# Patient Record
(demographics unavailable — no encounter records)

---

## 2025-01-22 NOTE — HISTORY OF PRESENT ILLNESS
[FreeTextEntry1] : 29-year-old female presents for well woman exam.  She has no complaints today.  Menarche was at the age of 16.  Menses are every 28 days, lasting 7 days.  She does get heavy bleeding for the first day but does not soak through more than 1 pad per hour.  She does get cramping that is relieved with ibuprofen.  She has no significant GYN history.  She is sexually active and uses condoms for contraception.  Past medical history is significant for seasonal allergies.  She has never had surgery.  Family history is significant for a maternal aunt with breast cancer.  She is unsure of the exact age of her aunts diagnosis but thinks she was between the age of 40 and 50.  She is unsure if she had genetic testing.  She socially drinks alcohol.  Denies tobacco or illicit drugs.  GYN history as above.  Nulligravida.  No known drug allergies.  She takes Claritin and multivitamins.

## 2025-01-22 NOTE — PLAN
[FreeTextEntry1] : 29-year-old female for well woman exam  1.  Pap done 2.  Self breast exam reviewed 3.  Family history of breast cancer in her maternal aunt.  Patient will get more information regarding the age of diagnosis and if she had genetic testing. 4.  Annual exam in 1 year

## 2025-01-22 NOTE — PHYSICAL EXAM
[Chaperone Present] : A chaperone was present in the examining room during all aspects of the physical examination [[id number]] : A chaperone was present during the pelvic exam. [FreeTextEntry2] : SHAGGY Purcell [Appropriately responsive] : appropriately responsive [Alert] : alert [No Acute Distress] : no acute distress [No Lymphadenopathy] : no lymphadenopathy [Regular Rate Rhythm] : regular rate rhythm [No Murmurs] : no murmurs [Clear to Auscultation B/L] : clear to auscultation bilaterally [Soft] : soft [Non-tender] : non-tender [Non-distended] : non-distended [No HSM] : No HSM [No Lesions] : no lesions [No Mass] : no mass [Oriented x3] : oriented x3 [Examination Of The Breasts] : a normal appearance [No Masses] : no breast masses were palpable [Labia Majora] : normal [Labia Minora] : normal [Normal] : normal [Uterine Adnexae] : normal